# Patient Record
Sex: MALE | Race: BLACK OR AFRICAN AMERICAN
[De-identification: names, ages, dates, MRNs, and addresses within clinical notes are randomized per-mention and may not be internally consistent; named-entity substitution may affect disease eponyms.]

---

## 2018-01-13 ENCOUNTER — HOSPITAL ENCOUNTER (EMERGENCY)
Dept: HOSPITAL 61 - ER | Age: 53
Discharge: ANOTHER HOSPITAL | End: 2018-01-13
Payer: COMMERCIAL

## 2018-01-13 VITALS — SYSTOLIC BLOOD PRESSURE: 134 MMHG | DIASTOLIC BLOOD PRESSURE: 76 MMHG

## 2018-01-13 DIAGNOSIS — J36: Primary | ICD-10-CM

## 2018-01-13 DIAGNOSIS — K12.2: ICD-10-CM

## 2018-01-13 DIAGNOSIS — D72.829: ICD-10-CM

## 2018-01-13 DIAGNOSIS — J45.909: ICD-10-CM

## 2018-01-13 LAB
ADD MAN DIFF?: NO
AGAP ISTAT: 15 MMOL/L (ref 6–14)
BASO #: 0 X10^3/UL (ref 0–0.2)
BASO %: 0 % (ref 0–3)
BUN ISTAT: 15 MG/DL (ref 8–26)
CHLORIDE SERPL-SCNC: 103 MMOL/L (ref 98–110)
CREATININE ISTAT: 0.9 MG/DL (ref 0.5–1.4)
EOS #: 0.1 X10^3/UL (ref 0–0.7)
EOS %: 1 % (ref 0–3)
GLUCOSE BLD-MCNC: 104 MG/DL (ref 70–99)
HCG SERPL-ACNC: 11.9 X10^3/UL (ref 4–11)
HEMATOCRIT ISTAT: 47 % (ref 37–52)
HEMATOCRIT: 45.1 % (ref 39–53)
HEMOGLOBIN ISTAT: 16 G/DL (ref 14–18)
HEMOGLOBIN: 15.1 G/DL (ref 13–17.5)
INFLUENZA A PATIENT: NEGATIVE
INFLUENZA B PATIENT: NEGATIVE
ION CA ISTAT: 1.12 MMOL/L (ref 1.13–1.32)
LYMPH #: 1 X10^3/UL (ref 1–4.8)
LYMPH %: 8 % (ref 24–48)
MEAN CORPUSCULAR HEMOGLOBIN: 31 PG (ref 25–35)
MEAN CORPUSCULAR HGB CONC: 33 G/DL (ref 31–37)
MEAN CORPUSCULAR VOLUME: 92 FL (ref 79–100)
MONO #: 1.1 X10^3/UL (ref 0–1.1)
MONO %: 9 % (ref 0–9)
NEUT #: 9.8 X10^3UL (ref 1.8–7.7)
NEUT %: 82 % (ref 31–73)
OBC FLU: (no result)
PLATELET COUNT: 228 X10^3/UL (ref 140–400)
POTASSIUM ISTAT: 3.8 MMOL/L (ref 3.5–5)
RED BLOOD COUNT: 4.91 X10^6/UL (ref 4.3–5.7)
RED CELL DISTRIBUTION WIDTH: 13.3 % (ref 11.5–14.5)
SODIUM SERPL-SCNC: 136 MMOL/L (ref 135–145)
TOT CO2 ISTAT: 23 MMOL/L (ref 23–32)

## 2018-01-13 PROCEDURE — 99285 EMERGENCY DEPT VISIT HI MDM: CPT

## 2018-01-13 PROCEDURE — 87804 INFLUENZA ASSAY W/OPTIC: CPT

## 2018-01-13 PROCEDURE — 87070 CULTURE OTHR SPECIMN AEROBIC: CPT

## 2018-01-13 PROCEDURE — 85025 COMPLETE CBC W/AUTO DIFF WBC: CPT

## 2018-01-13 PROCEDURE — 96375 TX/PRO/DX INJ NEW DRUG ADDON: CPT

## 2018-01-13 PROCEDURE — 94640 AIRWAY INHALATION TREATMENT: CPT

## 2018-01-13 PROCEDURE — 36415 COLL VENOUS BLD VENIPUNCTURE: CPT

## 2018-01-13 PROCEDURE — 87880 STREP A ASSAY W/OPTIC: CPT

## 2018-01-13 PROCEDURE — 80047 BASIC METABLC PNL IONIZED CA: CPT

## 2018-01-13 PROCEDURE — 96374 THER/PROPH/DIAG INJ IV PUSH: CPT

## 2018-01-13 PROCEDURE — 70491 CT SOFT TISSUE NECK W/DYE: CPT

## 2018-01-13 RX ADMIN — IOHEXOL 1 ML: 300 INJECTION, SOLUTION INTRAVENOUS at 04:30

## 2018-01-13 RX ADMIN — KETOROLAC TROMETHAMINE 1 MG: 30 INJECTION, SOLUTION INTRAMUSCULAR at 03:47

## 2018-01-13 RX ADMIN — PIPERACILLIN SODIUM AND TAZOBACTAM SODIUM 1 GM: 4; .5 INJECTION, POWDER, FOR SOLUTION INTRAVENOUS at 05:06

## 2018-01-13 RX ADMIN — FAMOTIDINE 1 MG: 10 INJECTION, SOLUTION INTRAVENOUS at 03:47

## 2018-01-13 RX ADMIN — IPRATROPIUM BROMIDE AND ALBUTEROL SULFATE 1 ML: .5; 3 SOLUTION RESPIRATORY (INHALATION) at 03:58

## 2018-01-13 RX ADMIN — METHYLPREDNISOLONE SODIUM SUCCINATE 1 MG: 125 INJECTION, POWDER, FOR SOLUTION INTRAMUSCULAR; INTRAVENOUS at 03:47

## 2018-01-14 LAB
NEGATIVE OBC STREP: (no result)
POSITIVE OBC STREP: (no result)

## 2021-10-25 ENCOUNTER — HOSPITAL ENCOUNTER (OUTPATIENT)
Dept: HOSPITAL 61 - SURGPAT | Age: 56
End: 2021-10-25
Attending: ORTHOPAEDIC SURGERY
Payer: COMMERCIAL

## 2021-10-25 DIAGNOSIS — R94.31: ICD-10-CM

## 2021-10-25 DIAGNOSIS — Z01.818: Primary | ICD-10-CM

## 2021-10-25 DIAGNOSIS — I45.10: ICD-10-CM

## 2021-10-25 DIAGNOSIS — M16.11: ICD-10-CM

## 2021-10-25 LAB
ALBUMIN SERPL-MCNC: 3.7 G/DL (ref 3.4–5)
ANION GAP SERPL CALC-SCNC: 9 MMOL/L (ref 6–14)
APTT BLD: 33 SEC (ref 24–38)
BASOPHILS # BLD AUTO: 0 X10^3/UL (ref 0–0.2)
BASOPHILS NFR BLD: 1 % (ref 0–3)
BUN SERPL-MCNC: 21 MG/DL (ref 8–26)
CALCIUM SERPL-MCNC: 8.9 MG/DL (ref 8.5–10.1)
CHLORIDE SERPL-SCNC: 103 MMOL/L (ref 98–107)
CO2 SERPL-SCNC: 27 MMOL/L (ref 21–32)
CREAT SERPL-MCNC: 0.8 MG/DL (ref 0.7–1.3)
EOSINOPHIL NFR BLD: 0.2 X10^3/UL (ref 0–0.7)
EOSINOPHIL NFR BLD: 4 % (ref 0–3)
ERYTHROCYTE [DISTWIDTH] IN BLOOD BY AUTOMATED COUNT: 15.7 % (ref 11.5–14.5)
GFR SERPLBLD BASED ON 1.73 SQ M-ARVRAT: 121 ML/MIN
GLUCOSE SERPL-MCNC: 100 MG/DL (ref 70–99)
HCT VFR BLD CALC: 41.8 % (ref 39–53)
HGB BLD-MCNC: 14 G/DL (ref 13–17.5)
LYMPHOCYTES # BLD: 1 X10^3/UL (ref 1–4.8)
LYMPHOCYTES NFR BLD AUTO: 21 % (ref 24–48)
MCH RBC QN AUTO: 32 PG (ref 25–35)
MCHC RBC AUTO-ENTMCNC: 34 G/DL (ref 31–37)
MCV RBC AUTO: 95 FL (ref 79–100)
MONO #: 0.3 X10^3/UL (ref 0–1.1)
MONOCYTES NFR BLD: 7 % (ref 0–9)
NEUT #: 3 X10^3/UL (ref 1.8–7.7)
NEUTROPHILS NFR BLD AUTO: 67 % (ref 31–73)
PLATELET # BLD AUTO: 263 X10^3/UL (ref 140–400)
POTASSIUM SERPL-SCNC: 4.2 MMOL/L (ref 3.5–5.1)
PROTHROMBIN TIME: 14 SEC (ref 11.7–14)
RBC # BLD AUTO: 4.41 X10^6/UL (ref 4.3–5.7)
SODIUM SERPL-SCNC: 139 MMOL/L (ref 136–145)
WBC # BLD AUTO: 4.5 X10^3/UL (ref 4–11)

## 2021-10-25 PROCEDURE — 82040 ASSAY OF SERUM ALBUMIN: CPT

## 2021-10-25 PROCEDURE — 93005 ELECTROCARDIOGRAM TRACING: CPT

## 2021-10-25 PROCEDURE — 85025 COMPLETE CBC W/AUTO DIFF WBC: CPT

## 2021-10-25 PROCEDURE — 83036 HEMOGLOBIN GLYCOSYLATED A1C: CPT

## 2021-10-25 PROCEDURE — 36415 COLL VENOUS BLD VENIPUNCTURE: CPT

## 2021-10-25 PROCEDURE — 87641 MR-STAPH DNA AMP PROBE: CPT

## 2021-10-25 PROCEDURE — 80048 BASIC METABOLIC PNL TOTAL CA: CPT

## 2021-10-25 PROCEDURE — 85610 PROTHROMBIN TIME: CPT

## 2021-10-25 PROCEDURE — 82306 VITAMIN D 25 HYDROXY: CPT

## 2021-10-25 PROCEDURE — 85730 THROMBOPLASTIN TIME PARTIAL: CPT

## 2021-10-25 PROCEDURE — 85651 RBC SED RATE NONAUTOMATED: CPT

## 2021-10-25 NOTE — EKG
[facility]

              [address] [place] [address]

Test Date:    2021-10-25               Test Time:    12:26:14

Pat Name:     ZORAN DOUGLASS         Department:   

Patient ID:   [id number]           Room:          

Gender:       M                        Technician:   KAN

:          1965               Requested By: LÁZARO CISNEROS

Order Number: [id number]           Reading MD:   Cesar Ornelas

                                 Measurements

Intervals                              Axis          

Rate:         76                       P:            60

MO:           144                      QRS:          10

QRSD:         98                       T:            -2

QT:           356                                    

QTc:          405                                    

                           Interpretive Statements

SINUS RHYTHM

LEFT ATRIAL ABNORMALITY

INCOMPLETE RIGHT BUNDLE BRANCH BLOCK

T ABNORMALITY IN INFERIOR LEADS

ABNORMAL ECG

RI6.01

No previous ECG available for comparison

Electronically Signed On 10- 15:52:05 CDT by Cesar Ornelas

## 2021-10-26 LAB — HBA1C MFR BLD: 5.6 % (ref 4.8–5.6)

## 2021-11-04 VITALS — SYSTOLIC BLOOD PRESSURE: 137 MMHG | DIASTOLIC BLOOD PRESSURE: 76 MMHG

## 2021-11-09 ENCOUNTER — HOSPITAL ENCOUNTER (OUTPATIENT)
Dept: HOSPITAL 61 - SURG | Age: 56
Setting detail: OBSERVATION
LOS: 2 days | Discharge: HOME | End: 2021-11-11
Attending: ORTHOPAEDIC SURGERY | Admitting: ORTHOPAEDIC SURGERY
Payer: COMMERCIAL

## 2021-11-09 VITALS — HEIGHT: 68 IN | BODY MASS INDEX: 25.96 KG/M2 | WEIGHT: 171.3 LBS

## 2021-11-09 VITALS — SYSTOLIC BLOOD PRESSURE: 147 MMHG | DIASTOLIC BLOOD PRESSURE: 89 MMHG

## 2021-11-09 VITALS — DIASTOLIC BLOOD PRESSURE: 71 MMHG | SYSTOLIC BLOOD PRESSURE: 109 MMHG

## 2021-11-09 VITALS — SYSTOLIC BLOOD PRESSURE: 122 MMHG | DIASTOLIC BLOOD PRESSURE: 80 MMHG

## 2021-11-09 VITALS — SYSTOLIC BLOOD PRESSURE: 142 MMHG | DIASTOLIC BLOOD PRESSURE: 75 MMHG

## 2021-11-09 DIAGNOSIS — Z71.85: ICD-10-CM

## 2021-11-09 DIAGNOSIS — I10: ICD-10-CM

## 2021-11-09 DIAGNOSIS — Z23: ICD-10-CM

## 2021-11-09 DIAGNOSIS — Z79.899: ICD-10-CM

## 2021-11-09 DIAGNOSIS — M16.11: Primary | ICD-10-CM

## 2021-11-09 DIAGNOSIS — Z98.890: ICD-10-CM

## 2021-11-09 LAB
APTT BLD: 34 SEC (ref 24–38)
PROTHROMBIN TIME: 15.2 SEC (ref 11.7–14)

## 2021-11-09 PROCEDURE — 97535 SELF CARE MNGMENT TRAINING: CPT

## 2021-11-09 PROCEDURE — A6550 NEG PRES WOUND THER DRSG SET: HCPCS

## 2021-11-09 PROCEDURE — 76000 FLUOROSCOPY <1 HR PHYS/QHP: CPT

## 2021-11-09 PROCEDURE — 27130 TOTAL HIP ARTHROPLASTY: CPT

## 2021-11-09 PROCEDURE — 90686 IIV4 VACC NO PRSV 0.5 ML IM: CPT

## 2021-11-09 PROCEDURE — 85730 THROMBOPLASTIN TIME PARTIAL: CPT

## 2021-11-09 PROCEDURE — 90471 IMMUNIZATION ADMIN: CPT

## 2021-11-09 PROCEDURE — A4930 STERILE, GLOVES PER PAIR: HCPCS

## 2021-11-09 PROCEDURE — 85610 PROTHROMBIN TIME: CPT

## 2021-11-09 PROCEDURE — A4213 20+ CC SYRINGE ONLY: HCPCS

## 2021-11-09 PROCEDURE — A6258 TRANSPARENT FILM >16<=48 IN: HCPCS

## 2021-11-09 PROCEDURE — 97530 THERAPEUTIC ACTIVITIES: CPT

## 2021-11-09 PROCEDURE — 85018 HEMOGLOBIN: CPT

## 2021-11-09 PROCEDURE — 88304 TISSUE EXAM BY PATHOLOGIST: CPT

## 2021-11-09 PROCEDURE — 36415 COLL VENOUS BLD VENIPUNCTURE: CPT

## 2021-11-09 PROCEDURE — G0378 HOSPITAL OBSERVATION PER HR: HCPCS

## 2021-11-09 PROCEDURE — 88311 DECALCIFY TISSUE: CPT

## 2021-11-09 PROCEDURE — 96376 TX/PRO/DX INJ SAME DRUG ADON: CPT

## 2021-11-09 PROCEDURE — C1776 JOINT DEVICE (IMPLANTABLE): HCPCS

## 2021-11-09 PROCEDURE — 97166 OT EVAL MOD COMPLEX 45 MIN: CPT

## 2021-11-09 PROCEDURE — 86900 BLOOD TYPING SEROLOGIC ABO: CPT

## 2021-11-09 PROCEDURE — 86850 RBC ANTIBODY SCREEN: CPT

## 2021-11-09 PROCEDURE — 86901 BLOOD TYPING SEROLOGIC RH(D): CPT

## 2021-11-09 PROCEDURE — G0379 DIRECT REFER HOSPITAL OBSERV: HCPCS

## 2021-11-09 PROCEDURE — 85014 HEMATOCRIT: CPT

## 2021-11-09 PROCEDURE — 97162 PT EVAL MOD COMPLEX 30 MIN: CPT

## 2021-11-09 PROCEDURE — 96374 THER/PROPH/DIAG INJ IV PUSH: CPT

## 2021-11-09 PROCEDURE — 97116 GAIT TRAINING THERAPY: CPT

## 2021-11-09 PROCEDURE — 97150 GROUP THERAPEUTIC PROCEDURES: CPT

## 2021-11-09 PROCEDURE — C1755 CATHETER, INTRASPINAL: HCPCS

## 2021-11-09 RX ADMIN — MONTELUKAST SODIUM SCH MG: 10 TABLET, FILM COATED ORAL at 20:14

## 2021-11-09 RX ADMIN — FENTANYL CITRATE PRN MCG: 50 INJECTION INTRAMUSCULAR; INTRAVENOUS at 16:12

## 2021-11-09 RX ADMIN — BACITRACIN SCH MLS/HR: 5000 INJECTION, POWDER, FOR SOLUTION INTRAMUSCULAR at 17:45

## 2021-11-09 RX ADMIN — PROCHLORPERAZINE EDISYLATE PRN MG: 5 INJECTION INTRAMUSCULAR; INTRAVENOUS at 16:50

## 2021-11-09 RX ADMIN — HYDROMORPHONE HYDROCHLORIDE PRN MG: 2 INJECTION INTRAMUSCULAR; INTRAVENOUS; SUBCUTANEOUS at 17:03

## 2021-11-09 RX ADMIN — HYDROMORPHONE HYDROCHLORIDE PRN MG: 2 INJECTION INTRAMUSCULAR; INTRAVENOUS; SUBCUTANEOUS at 17:23

## 2021-11-09 RX ADMIN — HYDROMORPHONE HYDROCHLORIDE PRN MG: 2 INJECTION INTRAMUSCULAR; INTRAVENOUS; SUBCUTANEOUS at 17:13

## 2021-11-09 RX ADMIN — FENTANYL CITRATE PRN MCG: 50 INJECTION INTRAMUSCULAR; INTRAVENOUS at 16:08

## 2021-11-09 RX ADMIN — LABETALOL HCL SCH MG: 100 TABLET, FILM COATED ORAL at 20:15

## 2021-11-09 RX ADMIN — MORPHINE SULFATE PRN MG: 2 INJECTION, SOLUTION INTRAMUSCULAR; INTRAVENOUS at 16:32

## 2021-11-09 RX ADMIN — CEFAZOLIN SCH GM: 330 INJECTION, POWDER, FOR SOLUTION INTRAMUSCULAR; INTRAVENOUS at 20:14

## 2021-11-09 RX ADMIN — ZOLPIDEM TARTRATE PRN MG: 5 TABLET ORAL at 23:29

## 2021-11-09 RX ADMIN — MORPHINE SULFATE PRN MG: 2 INJECTION, SOLUTION INTRAMUSCULAR; INTRAVENOUS at 16:43

## 2021-11-09 RX ADMIN — PROCHLORPERAZINE EDISYLATE PRN MG: 5 INJECTION INTRAMUSCULAR; INTRAVENOUS at 16:14

## 2021-11-09 RX ADMIN — HYDROMORPHONE HYDROCHLORIDE PRN MG: 2 INJECTION INTRAMUSCULAR; INTRAVENOUS; SUBCUTANEOUS at 16:52

## 2021-11-09 RX ADMIN — ONDANSETRON SCH MG: 4 TABLET, ORALLY DISINTEGRATING ORAL at 18:00

## 2021-11-09 RX ADMIN — ONDANSETRON SCH MG: 2 INJECTION INTRAMUSCULAR; INTRAVENOUS at 18:00

## 2021-11-09 NOTE — HP
DATE OF SERVICE: 11/09/2021

ADMIT DATE: 11/09/2021

CHIEF COMPLAINT:  Right hip pain and gait difficulties.



HISTORY OF PRESENT ILLNESS:  The patient is a 56-year-old male who states that 

overall pain has started about a year and a half ago and has increased to where 

it is very severe affecting his activities of daily living including impacting 

his ability to ride his bicycle, which is a desired activity, but most recently 

difficulty really even getting around walking without severe pain with minimal 

activity.  He otherwise had tried to remain very active.  More recently, uses a 

cane as a precaution and indicates that he has pain on startup.  It is a bit 

improved when he starts to walk, but then becomes very severe with increased 

activities.  It is in the right groin crease area with no radiation and 

accompanied by stiffness, especially with walking long distances.



PAST MEDICAL HISTORY:  Significant for seasonal allergies and hypertension.



PAST SURGICAL HISTORY:  He denies any past surgical history.



FAMILY HISTORY:  Noncontributory.



SOCIAL HISTORY:  Denies smoking, alcohol or drug use.



MEDICATIONS:  Include labetalol and another heart medication as well as an 

inhaler.



ALLERGIES:  He has no known drug allergies.



REVIEW OF SYSTEMS:  Denies any chest pain, shortness of breath, fever, chills, 

focal weakness, numbness, tingling or other constitutional symptoms.



PHYSICAL EXAMINATION:

VITAL SIGNS:  Per admission sheet.

HEENT:  Atraumatic, normocephalic.

HEART:  Regular rate and rhythm.

LUNGS:  Clear to auscultation bilaterally.

ABDOMEN:  Benign.

ORTHOPEDIC:  Examination of the right hip reveals very limited motion in all 

planes and extreme pain on any attempted extremes of his motion, which is 

already very restricted. Motor strength is overall intact.  There is no 

instability.  Slight leg length discrepancy with the right hip being a bit 

shorter. Left hip has full range of motion.  No tenderness over either 

trochanteric bursa.  Negative straight leg raise bilaterally.  Normal alignment,

stability bilateral knees and ankles with intact motor function, distal pulses, 

sensation, reflexes, skin in both lower extremities throughout.



LABORATORY DATA:  X-rays show bone on bone degenerative change on the right hip 

and cystic changes in the superior acetabulum.



IMPRESSION:  Primary osteoarthritis, right hip.



TREATMENT PLAN:  We have discussed risks, benefits, postoperative course of 

total hip arthroplasty and the really limited nonoperative measures that he has 

already generally exhausted and would like to remain active.  We therefore 

discussed risks, benefits, postoperative course of the total hip arthroplasty 

including the possibility of infection, nerve or blood vessel damage, leg length

inequality, instability, premature wear or loosening, medical or other 

anesthetic complications among others.  All his questions were answered and he 

does wish to proceed with total hip arthroplasty from an anterior approach and 

will include [facility] observation to follow.







MARKOS

DR: Berkley   DD: 11/09/2021 19:01

DT: 11/09/2021 19:28   TID: [id number]

## 2021-11-09 NOTE — PDOC4
Operative Note


Operative Note


Date of surgery: 11/9/2021





Preoperative diagnosis: Degenerative joint disease right hip





Postoperative diagnosis: Same





Operative procedure: Right total hip arthroplasty with anterior approach 





Surgeon Renea





Assistant: Brody jeffries





Anesthesia: General





Estimated blood loss:  300 cc





Complications: None





Drains: None





Operative indications: Please see my orthopedic clinic note and dictated history

and physical for detailed operative indications and note that we covered risks 

benefits postoperative course of the procedure.  We did discuss the goal of equa

lizing his leg lengths and that may not be entirely possible due to stability 

concerns which would override.  We specifically discussed the possibility of 

infection leg length inequality, nerve or blood vessel damage premature wear or 

loosening medical or other anesthetic complications among others.   All his 

questions were answered and he wishes to proceed with surgical evaluation and 

treatment having given informed consent





Operative text: Patient was identified procedure verified patient placed in the 

supine position on the [place] fracture table after adequate amounts of general 

anesthesia were administered.  All bony prominences were well-padded and left 

hip was prepped and draped in the standard sterile fashion.  After timeout was 

performed patient procedure identified and verified an incision was made just 

distal to the anterior superior iliac spine running along the tensor fascia april

for a distance of about 6 inches.  Fascia was incised tensor fascia april was 

taken laterally and circumflex vessels were located and coagulated and the 

anterior capsule was exposed with the rectus femoris gently retracted medially 

along with the underlying fascia that was dissected free.  Capsule was split in 

a T-shaped incision and superior aspect of the capsule was excised and further 

superior release was carried out with the hip in external rotation.  Hip was 

returned to 40 degrees external rotation and a napkin ring cut was made with an 

Avenir Gfity broach for reference napkin ring was removed and femoral head was 

removed and sized.  Reaming was carried out to a size 55 with a size 56 Biomet 

G7 acetabular shell was placed in proper version under fluoroscopic guidance and

excellent scratch fit was noted.  A 40 mm vitamin E liner was impacted into 

place.  Femur was brought into maximum external rotation extension and adduction

and release was carried out at the 11 o'clock position to free up the femur and 

retractors were placed medially and above the greater trochanter for maximum 

femoral exposure box osteotome was used along with the rattail rasp and 

successive size broaching up to a size 3 which provided excellent stability and 

fit within the canal.  Calcar reaming was carried out and trial fitting with a -

3.5 40 mm head to reproduce leg length and offset appropriately under 

fluoroscopic guidance and matched leg lengths and offset to the contralateral 

left hip.  Trial components were removed and a size 3 standard offset collared 

Avenir stem was impacted into place with a -3.5 ceramic 40 mm head.  Excellent 

stability and range of motion were noted and leg length and offset were 

reproduced closely according to preoperative measurements and measurements from 

the contralateral side.  Thorough irrigation carried out with normal saline 

solution and pulse lavage.  Intra-articular mixture was injected subperiosteally

throughout the joint capsule and subcutaneous areas and 1 g vancomycin sprinkled

throughout the joint capsule area.  Fascia was closed with #1 PDS strata fix 

suture in a running fashion subcutaneous closure with buried Vicryl skin closure

with subcuticular Monocryl and a june dressing was applied.  Patient was 

returned to recovery room in stable condition having tolerated the procedure 

well.  Brody jeffries was present for the procedure and assisted in 

the patient positioning prepping draping retraction closure and dressings











LÁZARO CISNEROS MD            Nov 9, 2021 18:45

## 2021-11-10 VITALS — SYSTOLIC BLOOD PRESSURE: 119 MMHG | DIASTOLIC BLOOD PRESSURE: 71 MMHG

## 2021-11-10 VITALS — SYSTOLIC BLOOD PRESSURE: 123 MMHG | DIASTOLIC BLOOD PRESSURE: 74 MMHG

## 2021-11-10 VITALS — DIASTOLIC BLOOD PRESSURE: 77 MMHG | SYSTOLIC BLOOD PRESSURE: 131 MMHG

## 2021-11-10 VITALS — SYSTOLIC BLOOD PRESSURE: 108 MMHG | DIASTOLIC BLOOD PRESSURE: 66 MMHG

## 2021-11-10 VITALS — DIASTOLIC BLOOD PRESSURE: 73 MMHG | SYSTOLIC BLOOD PRESSURE: 143 MMHG

## 2021-11-10 LAB — PROTHROMBIN TIME: 16 SEC (ref 11.7–14)

## 2021-11-10 RX ADMIN — LABETALOL HCL SCH MG: 100 TABLET, FILM COATED ORAL at 12:47

## 2021-11-10 RX ADMIN — ONDANSETRON SCH MG: 2 INJECTION INTRAMUSCULAR; INTRAVENOUS at 06:00

## 2021-11-10 RX ADMIN — ONDANSETRON SCH MG: 4 TABLET, ORALLY DISINTEGRATING ORAL at 06:00

## 2021-11-10 RX ADMIN — Medication PRN EACH: at 10:04

## 2021-11-10 RX ADMIN — ONDANSETRON SCH MG: 4 TABLET, ORALLY DISINTEGRATING ORAL at 00:00

## 2021-11-10 RX ADMIN — CEFAZOLIN SCH GM: 330 INJECTION, POWDER, FOR SOLUTION INTRAMUSCULAR; INTRAVENOUS at 06:26

## 2021-11-10 RX ADMIN — MONTELUKAST SODIUM SCH MG: 10 TABLET, FILM COATED ORAL at 21:03

## 2021-11-10 RX ADMIN — LABETALOL HCL SCH MG: 100 TABLET, FILM COATED ORAL at 21:00

## 2021-11-10 RX ADMIN — MULTIPLE VITAMINS W/ MINERALS TAB SCH TAB: TAB at 09:08

## 2021-11-10 RX ADMIN — ONDANSETRON SCH MG: 4 TABLET, ORALLY DISINTEGRATING ORAL at 12:00

## 2021-11-10 RX ADMIN — ACETAMINOPHEN SCH MG: 500 TABLET ORAL at 16:08

## 2021-11-10 RX ADMIN — Medication SCH MG: at 16:08

## 2021-11-10 RX ADMIN — SENNOSIDES AND DOCUSATE SODIUM SCH TAB: 8.6; 5 TABLET ORAL at 09:08

## 2021-11-10 RX ADMIN — ACETAMINOPHEN SCH MG: 500 TABLET ORAL at 20:07

## 2021-11-10 RX ADMIN — MELOXICAM SCH MG: 7.5 TABLET ORAL at 09:08

## 2021-11-10 RX ADMIN — Medication SCH MG: at 09:08

## 2021-11-10 RX ADMIN — ZOLPIDEM TARTRATE PRN MG: 5 TABLET ORAL at 21:03

## 2021-11-10 RX ADMIN — ACETAMINOPHEN SCH MG: 500 TABLET ORAL at 09:08

## 2021-11-10 RX ADMIN — ONDANSETRON SCH MG: 2 INJECTION INTRAMUSCULAR; INTRAVENOUS at 00:00

## 2021-11-10 RX ADMIN — CEFAZOLIN SCH GM: 330 INJECTION, POWDER, FOR SOLUTION INTRAMUSCULAR; INTRAVENOUS at 00:49

## 2021-11-10 RX ADMIN — BACITRACIN SCH MLS/HR: 5000 INJECTION, POWDER, FOR SOLUTION INTRAMUSCULAR at 16:10

## 2021-11-10 RX ADMIN — ONDANSETRON SCH MG: 2 INJECTION INTRAMUSCULAR; INTRAVENOUS at 12:00

## 2021-11-10 NOTE — NUR
at 1400 became lightheaded in therapy blood pressure was 77/45 hr 87 feels faint. legs 
elevated and chair tilted. 1410 blood pressure is 100/67 hr 80 with 1000cc normal saline 
started at 500 cc hr. 1415  blood pressure is 103/66 hr 88 feeling better 1420 sat up blood 
pressure is 94/58 denies lightheadedness. 1430--blood pressure is 88/58 hr 88 .  1445 He is 
feeling better ambulated to door then placed in recliner and taken to room. remains reclined 
wit feet elevated. ivf continue at 500cc hr.

## 2021-11-10 NOTE — NUR
denies pain. has good movement in his right hip , sensation and pulses. mod amount of old 
drainage on EDU dressing.

## 2021-11-10 NOTE — PDOC
PROGRESS NOTES


Date of Service


DATE: 11/10/21 


TIME: 21:38





Subjective


Subjective


Problems overnight: Was doing very well earlier today ambulating with minimal 

assistance and said the hip feels good in terms of weightbearing mainly stiff 

and some muscle spasm; on my repeat visit this evening he had had a hypotensive 

episode but no loss of consciousness.  Not symptomatic currently fully 

responsive eating and drinking and currently getting an IV saline bolus





Objective


Vital Signs





Vital Signs








  Date Time  Temp Pulse Resp B/P (MAP) Pulse Ox O2 Delivery O2 Flow Rate FiO2


 


11/10/21 20:07   20   Room Air  


 


11/10/21 18:44 100.7 88  108/66 (80) 97   





 100.7       


 


11/9/21 20:15       2.0 








Physical Exam


Leg lengths equal distal neurovascular status intact good range of motion june 

dressing change due to some bleeding


Labs





Laboratory Tests








Test


 11/9/21


11:55 11/10/21


07:35


 


Prothrombin Time


 15.2 SEC


(11.7-14.0) 16.0 SEC


(11.7-14.0)


 


Prothromb Time International


Ratio 1.2 (0.8-1.1) 


 1.3 (0.8-1.1) 





 


Activated Partial


Thromboplast Time 34 SEC (24-38) 


 











Laboratory Tests








Test


 11/10/21


07:35


 


Prothrombin Time


 16.0 SEC


(11.7-14.0)


 


Prothromb Time International


Ratio 1.3 (0.8-1.1) 











Imaging


Intraoperative x-ray films show excellent placement and sizing of a total hip 

arthroplasty with equal leg lengths and restored offset





Assessment


Assessment


POD#1 total hip arthroplasty





Plan


Plan of Care


Now feels good with no symptoms, encouraged fluid intake and getting IV fluid 

bolus for a hypotensive episode but no symptoms or sequela currently, blood 

pressure medication held currently until and unless hypertensive


Continue Coumadin anticoagulation mobilize with physical therapy


Likely outpatient physical therapy on discharge





Justicifation of Admission Dx:


Justifications for Admission:


Justification of Admission Dx:  N/A











LÁZARO CISNEROS MD           Nov 10, 2021 21:42

## 2021-11-10 NOTE — NUR
Pharmacy Warfarin Dosing Note



S:Pharmacy consulted to assist with anticoagulation therapy started 11/09/21 with target 
INR: 1.6 - 2.5





O:ZORAN DOUGLASS is a 56 year old M with 

RIDGE 



LABS:

Last INR: 1.3 

Last HGB: 

Last HCT: 

Last PLT: 



Last dose of 7.5 mg given on 11/09/21 at 2014 

Previous Regimen: 

Vitamin K given: N 

Drug Interaction Changes: 

Ongoing Drug Interactions: 







A:INR of 1.3 is below desired range. Target range for this patient is: 1.6 - 2.5



P: Warfarin dose: 5 mg Today at 1600.

Bridge Therapy: None  

Next INR due tomorrow.

Pharmacy anticoagulation service will continue to follow.



 Michael Villagomez [facility], 11/10/21 1007

## 2021-11-10 NOTE — NUR
resting quietly in bed. states blood pressure is better and he is feeling better. concerned 
that he caused his blood pressure to drop. reassured that he did not do anything wrong. ivf 
continue at 150 cchr encouraged to drink fluids. pain is increasing . and medicated

## 2021-11-11 VITALS
DIASTOLIC BLOOD PRESSURE: 68 MMHG | SYSTOLIC BLOOD PRESSURE: 109 MMHG | DIASTOLIC BLOOD PRESSURE: 68 MMHG | SYSTOLIC BLOOD PRESSURE: 109 MMHG

## 2021-11-11 VITALS — DIASTOLIC BLOOD PRESSURE: 69 MMHG | SYSTOLIC BLOOD PRESSURE: 112 MMHG

## 2021-11-11 LAB
HCT VFR BLD CALC: 33.2 % (ref 39–53)
HGB BLD-MCNC: 11.3 G/DL (ref 13–17.5)
MCHC RBC AUTO-ENTMCNC: 34 G/DL (ref 31–37)
PROTHROMBIN TIME: 21.2 SEC (ref 11.7–14)

## 2021-11-11 RX ADMIN — MELOXICAM SCH MG: 7.5 TABLET ORAL at 08:39

## 2021-11-11 RX ADMIN — ACETAMINOPHEN SCH MG: 500 TABLET ORAL at 06:47

## 2021-11-11 RX ADMIN — SENNOSIDES AND DOCUSATE SODIUM SCH TAB: 8.6; 5 TABLET ORAL at 08:36

## 2021-11-11 RX ADMIN — LABETALOL HCL SCH MG: 100 TABLET, FILM COATED ORAL at 09:00

## 2021-11-11 RX ADMIN — ACETAMINOPHEN SCH MG: 500 TABLET ORAL at 03:00

## 2021-11-11 RX ADMIN — MULTIPLE VITAMINS W/ MINERALS TAB SCH TAB: TAB at 08:37

## 2021-11-11 RX ADMIN — ACETAMINOPHEN SCH MG: 500 TABLET ORAL at 15:08

## 2021-11-11 RX ADMIN — Medication PRN EACH: at 09:41

## 2021-11-11 RX ADMIN — Medication SCH MG: at 08:37

## 2021-11-11 NOTE — DS
DATE OF DISCHARGE: 11/11/2021

ORTHOPEDIC DISCHARGE SUMMARY



PRINCIPAL DIAGNOSIS:  Degenerative joint disease of the right hip.



PROCEDURE:  Right total hip arthroplasty, anterior.



DISPOSITION:  Home with outpatient physical therapy.



DISPOSITION MEDICATIONS:  Include warfarin as directed by Anticoagulation 

Clinic; oxycodone 5 mg p.o. q. 4 hours p.r.n. pain, dispense #40.  Continue 

preoperative medications except for holding his blood pressure medicine 

labetalol if his blood pressure is low or normal, resume the medication only if 

his blood pressure is above normal.



ACTIVITY:  Weightbearing as tolerated, progressive ambulation.  Avoid extremes 

of range of motion of the hip, but no formal hip precautions.



FOLLOWUP:  Mr. Park 2 weeks postoperatively.



BRIEF DESCRIPTION OF HOSPITAL COURSE:  The patient underwent an uncomplicated 

right total hip arthroplasty, had some drainage necessitating a dressing change 

and on postoperative day #1, had had a bit of a hypotensive episode, which he 

recovered from after a fluid bolus and emphasized some more drinking along with 

his ongoing diet until he was urinating about once an hour.  He had no further 

symptoms whatsoever as far as hypotension.  Denied any chest pain, shortness of 

breath or other ongoing symptoms and on the evening of postoperative day #1 and 

all day postoperative day #2, continued to get around very well with physical 

therapy.  Pain was reasonably controlled and he was discharged home in stable 

condition.







JEREMY

DR: Bekrley   DD: 11/11/2021 17:48

DT: 11/11/2021 18:16   TID: [id number]

## 2021-11-11 NOTE — NUR
Pharmacy Warfarin Dosing Note



S:Pharmacy consulted to assist with anticoagulation therapy started 11/09/21 with target 
INR: 1.6 - 2.5





O:ZORAN DOUGLASS is a 56 year old M with RIDGE 



LABS:

Last INR: 1.9 

Last HGB: 11.3 

Last HCT: 33.2 

Last PLT: -- 



Last dose of 5 mg given on 11/10/21 at 1608  

Vitamin K given: N 

Drug Interaction Changes: Same Interacting Drug

Ongoing Drug Interactions: Meloxicam 





A:INR of 1.9 is within desired range. Target range for this patient is: 1.6 - 2.5



P: Warfarin dose: 2.5 mg Prior to Discharge

Bridge Therapy: None  

Next INR due 11/15/21 outpatient

Pharmacy anticoagulation service will continue to follow.



 DILIP PALAFOX [facility], 11/11/21 0962

## 2021-11-11 NOTE — NUR
Slept in recliner most of the noc.

EDU dressing removed, incision cleansed w/ Chloraprep, no active bleeding seen.  Bordered 
gauze applied.

## 2021-11-11 NOTE — NUR
Discharge instructions given. Answered questions and concerns. Verbalized understanding. Pt 
waiting for ride home.

## 2021-11-11 NOTE — DISCH
DISCHARGE INSTRUCTIONS


Condition on Discharge


Condition on Discharge:  Stable





Activity After Discharge


Activity Instructions for Disc:  Activity as tolerated, Avoid exertion, Prog

ressive ambulation


Bathing Instructions:  Shower-keep dressing dry, No Tub Bath until see 


Lifting Instructions after Dis:  No heavy lifting, No pulling or pushing


Exercise Instruction after Dis:  Exercise per therapy, Progress as tolerated


Driving Instructions after Dis:  Do not drive


Weight Bearing Status after Di:  Full weight bearing





Diet after Discharge


Diet after Discharge:  Regular


Diet Texture:  Regular


Liquid Texture:  Thin Liquid


Swallowing Supervision:  None needed





Wound Incision Care


Wound/Incision Care:  Ice to area for comfort, Do not change dressing (Maintain 

june dressing, call if saturated, otherwise when suction machine stops at about 

1 week postop, cut tail of dressing and tape over to maintain seal)





Community/Resources/Services


Services at Discharge:  Outpatient Therapy (No formal hip precautions due to 

anterior approach, just avoid extreme ranges of motion)





Contacting the DRDorie after DC


Call your doctor for:  Concerns you may have





Follow-Up


Follow Up With:  [place] [facility] on 11/23/21 at 10 am [phone]





Treatment/Equipment after DC


Adaptive Equipment Issued:  None





Warfarin Follow-Up


Warfarin Follow UP:  [facility] to monitor warfarin dosage and testing











LÁZARO CISNEROS MD           Nov 11, 2021 17:30

## 2021-11-15 NOTE — PATHOLOGY
[facility] Accession Number: [id number]

.                                                                01

Material submitted:                                        .

femur - RIGHT FEMORAL HEAD. Modifiers: right, head

.                                                                01

Clinical history:                                          .

AVM

ANTERIOR R HIP

.                                                                02

**********************************************************************

Diagnosis:

Femoral head and separate segment of bone, anterior right hip

arthroplasty:

- Advanced degenerative arthritis with focal subarticular fibrosis and

small focus of avascular necrosis.

([facility]/db; 11/15/2021)

[facility]  11/15/2021  1111 Local

**********************************************************************

.                                                                02

Electronically signed:                                     .

Carlito Del Toro MD, Pathologist

NPI- [id number]

.                                                                01

Gross description:                                         .

The specimen is received in formalin, labeled "Ozzy Martinez, right

femoral head".  Received is a femoral head with a detached femoral neck

measuring 4.8 x 4.3 x 3.4 and 4.8 x 4.5 x 1.1 cm in greatest dimensions.

The articular surface is light tan to dark tan, roughened, and with signs

of eburnation. Surface opposite of the articular surface displays a

central cavitary defect. Sectioning reveals light tan-yellow and focally

hemorrhagic cut surfaces with no grossly distinct nodules or lesions.  The

specimen is submitted representatively in cassette A1 to A2, following

decalcification.([facility]; 11/10/2021)

[facility]  11/10/2021  [address] Local

.                                                                02

Pathologist provided ICD-10:

M16.11

.                                                                02

CPT                                                        .

020959, 990699

Specimen Comment: A courtesy copy of this report has been sent to [phone], [phone]

Specimen Comment: [id number]

Specimen Comment: Report sent to  / DR NIX

Specimen Comment: A duplicate report has been generated due to demographic updates.

***Performed at:  01

   [facility]

   [address], [place]  [id number]

   MD Silvestre Rucker MD Phone:  [phone]

***Performed at:  02

   [facility]

   [address], [place]  [id number]

   MD Carlito Del Toro MD Phone:  [phone]

## 2021-12-13 ENCOUNTER — HOSPITAL ENCOUNTER (OUTPATIENT)
Dept: HOSPITAL 61 - LAB | Age: 56
End: 2021-12-13
Attending: ORTHOPAEDIC SURGERY
Payer: COMMERCIAL

## 2021-12-13 DIAGNOSIS — Z79.01: ICD-10-CM

## 2021-12-13 DIAGNOSIS — Z51.81: Primary | ICD-10-CM

## 2021-12-13 LAB — PROTHROMBIN TIME: 17.5 SEC (ref 11.7–14)

## 2021-12-13 PROCEDURE — 85610 PROTHROMBIN TIME: CPT

## 2021-12-13 PROCEDURE — 36415 COLL VENOUS BLD VENIPUNCTURE: CPT
